# Patient Record
Sex: FEMALE | URBAN - METROPOLITAN AREA
[De-identification: names, ages, dates, MRNs, and addresses within clinical notes are randomized per-mention and may not be internally consistent; named-entity substitution may affect disease eponyms.]

---

## 2021-08-06 ENCOUNTER — OFFICE VISIT (OUTPATIENT)
Dept: URBAN - METROPOLITAN AREA CLINIC 6 | Facility: CLINIC | Age: 71
End: 2021-08-06
Payer: COMMERCIAL

## 2021-08-06 PROCEDURE — 92002 INTRM OPH EXAM NEW PATIENT: CPT | Performed by: OPTOMETRIST

## 2021-08-06 RX ORDER — CYCLOSPORINE 0.5 MG/ML
0.05 % EMULSION OPHTHALMIC
Qty: 180 | Refills: 3 | Status: ACTIVE
Start: 2021-08-06

## 2021-08-06 RX ORDER — LIFITEGRAST 50 MG/ML
5 % SOLUTION/ DROPS OPHTHALMIC
Qty: 180 | Refills: 3 | Status: ACTIVE
Start: 2021-08-06

## 2021-08-06 ASSESSMENT — INTRAOCULAR PRESSURE
OS: 19
OD: 19

## 2021-09-08 ENCOUNTER — OFFICE VISIT (OUTPATIENT)
Dept: URBAN - METROPOLITAN AREA CLINIC 6 | Facility: CLINIC | Age: 71
End: 2021-09-08
Payer: COMMERCIAL

## 2021-09-08 DIAGNOSIS — H26.492 OTHER SECONDARY CATARACT, LEFT EYE: ICD-10-CM

## 2021-09-08 DIAGNOSIS — H25.811 COMBINED FORMS OF AGE-RELATED CATARACT OF RIGHT EYE: ICD-10-CM

## 2021-09-08 DIAGNOSIS — E11.9 DIABETES MELLITUS TYPE 2 WITHOUT MENTION OF COMPLICATION: Primary | ICD-10-CM

## 2021-09-08 DIAGNOSIS — H43.313 VITREOUS MEMBRANES AND STRANDS, BILATERAL: ICD-10-CM

## 2021-09-08 PROCEDURE — 92014 COMPRE OPH EXAM EST PT 1/>: CPT | Performed by: OPTOMETRIST

## 2021-09-08 PROCEDURE — 92083 EXTENDED VISUAL FIELD XM: CPT | Performed by: OPTOMETRIST

## 2021-09-08 PROCEDURE — 92133 CPTRZD OPH DX IMG PST SGM ON: CPT | Performed by: OPTOMETRIST

## 2021-09-08 ASSESSMENT — INTRAOCULAR PRESSURE
OS: 20
OD: 16

## 2021-09-08 NOTE — IMPRESSION/PLAN
Impression: Primary open-angle glaucoma, left eye, severe stage: O14.1458. Plan: s/p SLT OU. Pt reports noncompliance with latanoprost QHS OU- IOP higher today OS. Discussed the importance of compliance to prevent vision loss. OCT RNFL shows I thinning OD and I thinning with S borderline thinning OS with 75/76. HVF 24-2 unreliable OU with superior defect OD and superior/inferior defects OS. Plan to recheck IOP at next visit.

## 2021-09-08 NOTE — IMPRESSION/PLAN
Impression: Diabetes mellitus Type 2 without mention of complication: T72.3. Plan: Diabetes Mellitus Type II without signs of diabetic retinopathy either eye - Discussed the pathophysiology of diabetes and its effect on the eye. Stressed the importance of strong glucose control. Advised of importance of at least annual dilated examinations, and to contact us immediately for any problems or concerns.

## 2021-09-08 NOTE — IMPRESSION/PLAN
Impression: Other secondary cataract, left eye: H26.492. Plan: Posterior capsular opacity (common clouding behind the intraocular lens implant) - At this time does not appear visually significant, so will hold off on intervention. Patient advised to contact us for any decrease vision, glare, or other visual problems.

## 2021-09-08 NOTE — IMPRESSION/PLAN
Impression: Combined forms of age-related cataract of right eye: H25.811. Plan: Moderate cataract: Appears to be visually significant and cataract extraction was offered to the patient, however, patient wishes to wait on surgery. In case they decide to proceed, plan to perform cataract surgery.

## 2021-09-15 ENCOUNTER — OFFICE VISIT (OUTPATIENT)
Dept: URBAN - METROPOLITAN AREA CLINIC 6 | Facility: CLINIC | Age: 71
End: 2021-09-15
Payer: COMMERCIAL

## 2021-09-15 DIAGNOSIS — H40.1123 PRIMARY OPEN-ANGLE GLAUCOMA, LEFT EYE, SEVERE STAGE: Primary | ICD-10-CM

## 2021-09-15 DIAGNOSIS — H16.223 KERATOCONJUNCTIVITIS SICCA, NOT SPECIFIED AS SJÖGREN'S, BILATERAL: ICD-10-CM

## 2021-09-15 DIAGNOSIS — H52.223 REGULAR ASTIGMATISM, BILATERAL: ICD-10-CM

## 2021-09-15 PROCEDURE — 92012 INTRM OPH EXAM EST PATIENT: CPT | Performed by: OPTOMETRIST

## 2021-09-15 ASSESSMENT — VISUAL ACUITY
OS: 20/25
OD: 20/50

## 2021-09-15 ASSESSMENT — INTRAOCULAR PRESSURE
OD: 18
OS: 22

## 2021-09-15 NOTE — IMPRESSION/PLAN
Impression: Primary open-angle glaucoma, left eye, severe stage: V08.0420. Plan: Primary Open Angle Glaucoma, severe stage - s/p SLT OU. IOP still elevated with latanoprost QHS OU-referred to Dr. Elis Juárez. Discussed with the patient that lack of compliance with drops/treatment and follow up visits can result in severe vision loss or blindness.